# Patient Record
Sex: FEMALE | Race: WHITE | URBAN - METROPOLITAN AREA
[De-identification: names, ages, dates, MRNs, and addresses within clinical notes are randomized per-mention and may not be internally consistent; named-entity substitution may affect disease eponyms.]

---

## 2024-01-02 ENCOUNTER — APPOINTMENT (RX ONLY)
Dept: URBAN - METROPOLITAN AREA CLINIC 145 | Facility: CLINIC | Age: 69
Setting detail: DERMATOLOGY
End: 2024-01-02

## 2024-01-02 DIAGNOSIS — Z41.9 ENCOUNTER FOR PROCEDURE FOR PURPOSES OTHER THAN REMEDYING HEALTH STATE, UNSPECIFIED: ICD-10-CM

## 2024-01-02 PROCEDURE — ? INVENTORY

## 2024-01-02 PROCEDURE — ? COSMETIC CONSULTATION: HAIR REMOVAL

## 2024-01-02 PROCEDURE — ? PALOMAR VECTUS LASER HAIR REMOVAL

## 2024-01-02 NOTE — PROCEDURE: PALOMAR VECTUS LASER HAIR REMOVAL
Comments: This was a test spot on submandibular chin. No charge for today. Patient will reschedule for treatment on another day. Quoted $110 per session for lip and chin.
Rate (Hz): Medium
Hair Diameter (Optional): Coarse
Render Post-Care In The Note: No
Pre-Procedure: Prior to proceeding the treatment areas were cleaned and all present put on their eye protection.
Treatment Number: 1
Consent: Written consent obtained, risks reviewed including but not limited to crusting, scabbing, blistering, scarring, darker or lighter pigmentary change, paradoxical hair regrowth, incomplete removal of hair and infection.
Optic Size: 12 x 12mm
Pulse Duration (Include Units): 19 ms
Melanin Index (Optional): III
Hair Color (Optional): Dark Brown
External Cooling Fan Speed: 0
Post-Care Instructions: I reviewed with the patient in detail post-care instructions. Patient should avoid sun for a minimum of 4 weeks before and after treatment.
Detail Level: Simple
Laser Type: diode 810nm
Cooling Override: chill tip
Fluence In J/Cm2: 24
Post-Procedure Care: Immediate endpoint: perifollicular erythema and edema. Post care was reviewed with the patient and all patient questions were answered to their satisfaction. SPF applied.

## 2024-01-04 ENCOUNTER — APPOINTMENT (RX ONLY)
Dept: URBAN - METROPOLITAN AREA CLINIC 145 | Facility: CLINIC | Age: 69
Setting detail: DERMATOLOGY
End: 2024-01-04

## 2024-01-04 DIAGNOSIS — Z41.9 ENCOUNTER FOR PROCEDURE FOR PURPOSES OTHER THAN REMEDYING HEALTH STATE, UNSPECIFIED: ICD-10-CM

## 2024-01-04 PROCEDURE — ? INVENTORY

## 2024-01-04 PROCEDURE — ? PALOMAR VECTUS LASER HAIR REMOVAL

## 2024-01-04 NOTE — PROCEDURE: PALOMAR VECTUS LASER HAIR REMOVAL
Comments: $110 per session for lip and chin. Holiday special -25% and patient had prepaid $55 for consult. Only $27.50 due for her visit today.
Rate (Hz): Medium
Hair Diameter (Optional): Coarse
Render Post-Care In The Note: No
Pre-Procedure: Prior to proceeding the treatment areas were cleaned and all present put on their eye protection.
Treatment Number: 1
Consent: Written consent obtained, risks reviewed including but not limited to crusting, scabbing, blistering, scarring, darker or lighter pigmentary change, paradoxical hair regrowth, incomplete removal of hair and infection.
Optic Size: 12 x 12mm
Pulse Duration (Include Units): 18 ms
Melanin Index (Optional): II
Hair Color (Optional): Dark Brown
External Cooling Fan Speed: 0
Post-Care Instructions: I reviewed with the patient in detail post-care instructions. Patient should avoid sun for a minimum of 4 weeks before and after treatment.
Detail Level: Simple
Laser Type: diode 810nm
Cooling Override: chill tip
Fluence In J/Cm2: 28
Post-Procedure Care: Immediate endpoint: perifollicular erythema and edema. Post care was reviewed with the patient and all patient questions were answered to their satisfaction. SPF applied.

## 2024-02-08 ENCOUNTER — APPOINTMENT (RX ONLY)
Dept: URBAN - METROPOLITAN AREA CLINIC 145 | Facility: CLINIC | Age: 69
Setting detail: DERMATOLOGY
End: 2024-02-08

## 2024-02-08 DIAGNOSIS — Z41.9 ENCOUNTER FOR PROCEDURE FOR PURPOSES OTHER THAN REMEDYING HEALTH STATE, UNSPECIFIED: ICD-10-CM

## 2024-02-08 PROCEDURE — ? PALOMAR VECTUS LASER HAIR REMOVAL

## 2024-02-08 PROCEDURE — ? INVENTORY

## 2024-02-08 NOTE — PROCEDURE: PALOMAR VECTUS LASER HAIR REMOVAL
Rate (Hz): Medium
Hair Diameter (Optional): Coarse
Render Post-Care In The Note: No
Pre-Procedure: Prior to proceeding the treatment areas were cleaned and all present put on their eye protection.
Treatment Number: 2
Consent: Written consent obtained, risks reviewed including but not limited to crusting, scabbing, blistering, scarring, darker or lighter pigmentary change, paradoxical hair regrowth, incomplete removal of hair and infection.
Optic Size: 12 x 12mm
Pulse Duration (Include Units): 18 ms
Melanin Index (Optional): II
Hair Color (Optional): Dark Brown
External Cooling Fan Speed: 0
Post-Care Instructions: I reviewed with the patient in detail post-care instructions. Patient should avoid sun for a minimum of 4 weeks before and after treatment.
Detail Level: Simple
Laser Type: diode 810nm
Cooling Override: chill tip
Fluence In J/Cm2: 28
Post-Procedure Care: Immediate endpoint: perifollicular erythema and edema. Post care was reviewed with the patient and all patient questions were answered to their satisfaction. SPF applied.

## 2024-03-19 ENCOUNTER — APPOINTMENT (RX ONLY)
Dept: URBAN - METROPOLITAN AREA CLINIC 145 | Facility: CLINIC | Age: 69
Setting detail: DERMATOLOGY
End: 2024-03-19

## 2024-03-19 DIAGNOSIS — Z41.9 ENCOUNTER FOR PROCEDURE FOR PURPOSES OTHER THAN REMEDYING HEALTH STATE, UNSPECIFIED: ICD-10-CM

## 2024-03-19 PROCEDURE — ? PALOMAR VECTUS LASER HAIR REMOVAL

## 2024-03-19 PROCEDURE — ? INVENTORY

## 2024-03-19 NOTE — PROCEDURE: PALOMAR VECTUS LASER HAIR REMOVAL
Comments: Patient quoted small area LHR $85 per session if more than 6 sessions are needed. Patient feels that there is still quite a bit of hair for it being the third session. Underlying conditions for excess hair growth discussed with patient. Inspected area thoroughly to ensure hairs were not white. Desired respond from treatment obtained during session today.
Rate (Hz): Medium
Hair Diameter (Optional): Coarse
Render Post-Care In The Note: No
Pre-Procedure: Prior to proceeding the treatment areas were cleaned and all present put on their eye protection.
Treatment Number: 3
Consent: Written consent obtained, risks reviewed including but not limited to crusting, scabbing, blistering, scarring, darker or lighter pigmentary change, paradoxical hair regrowth, incomplete removal of hair and infection.
Optic Size: 12 x 12mm
Pulse Duration (Include Units): 15 ms
Melanin Index (Optional): II
Hair Color (Optional): Black
External Cooling Fan Speed: 0
Post-Care Instructions: I reviewed with the patient in detail post-care instructions. Patient should avoid sun for a minimum of 4 weeks before and after treatment.
Detail Level: Simple
Laser Type: diode 810nm
Cooling Override: chill tip
Fluence In J/Cm2: 24
Post-Procedure Care: Immediate endpoint: perifollicular erythema and edema. Post care was reviewed with the patient and all patient questions were answered to their satisfaction. SPF applied.

## 2024-04-04 ENCOUNTER — APPOINTMENT (RX ONLY)
Dept: URBAN - METROPOLITAN AREA CLINIC 145 | Facility: CLINIC | Age: 69
Setting detail: DERMATOLOGY
End: 2024-04-04

## 2024-04-04 DIAGNOSIS — Z41.9 ENCOUNTER FOR PROCEDURE FOR PURPOSES OTHER THAN REMEDYING HEALTH STATE, UNSPECIFIED: ICD-10-CM

## 2024-04-04 PROCEDURE — ? PALOMAR VECTUS LASER HAIR REMOVAL

## 2024-04-04 PROCEDURE — ? INVENTORY

## 2024-04-04 NOTE — PROCEDURE: PALOMAR VECTUS LASER HAIR REMOVAL
Comments: Patient quoted small area LHR $85 per session if more than 6 sessions are needed. Patient feels that there is still quite a bit of hair for it being the third session. Underlying conditions for excess hair growth discussed with patient. Inspected area thoroughly to ensure hairs were not white. Desired respond from treatment obtained during session today.
Rate (Hz): Medium
Hair Diameter (Optional): Coarse
Render Post-Care In The Note: No
Pre-Procedure: Prior to proceeding the treatment areas were cleaned and all present put on their eye protection.
Treatment Number: 4
Consent: Written consent obtained, risks reviewed including but not limited to crusting, scabbing, blistering, scarring, darker or lighter pigmentary change, paradoxical hair regrowth, incomplete removal of hair and infection.
Optic Size: 12 x 12mm
Pulse Duration (Include Units): 16 ms
Melanin Index (Optional): II
Hair Color (Optional): Black
External Cooling Fan Speed: 0
Post-Care Instructions: I reviewed with the patient in detail post-care instructions. Patient should avoid sun for a minimum of 4 weeks before and after treatment.
Detail Level: Simple
Laser Type: diode 810nm
Cooling Override: chill tip
Fluence In J/Cm2: 25
Post-Procedure Care: Immediate endpoint: perifollicular erythema and edema. Post care was reviewed with the patient and all patient questions were answered to their satisfaction. SPF applied.

## 2024-04-30 ENCOUNTER — APPOINTMENT (RX ONLY)
Dept: URBAN - METROPOLITAN AREA CLINIC 145 | Facility: CLINIC | Age: 69
Setting detail: DERMATOLOGY
End: 2024-04-30

## 2024-04-30 DIAGNOSIS — Z41.9 ENCOUNTER FOR PROCEDURE FOR PURPOSES OTHER THAN REMEDYING HEALTH STATE, UNSPECIFIED: ICD-10-CM

## 2024-04-30 PROCEDURE — ? IPL HAIR REMOVAL

## 2024-04-30 PROCEDURE — ? ADDITIONAL NOTES

## 2024-04-30 PROCEDURE — ? INVENTORY

## 2024-04-30 ASSESSMENT — LOCATION DETAILED DESCRIPTION DERM
LOCATION DETAILED: LEFT LATERAL BUCCAL CHEEK
LOCATION DETAILED: RIGHT UPPER CUTANEOUS LIP
LOCATION DETAILED: RIGHT LATERAL BUCCAL CHEEK
LOCATION DETAILED: LEFT UPPER CUTANEOUS LIP
LOCATION DETAILED: LEFT CHIN
LOCATION DETAILED: SUBMENTAL CHIN
LOCATION DETAILED: PHILTRUM

## 2024-04-30 ASSESSMENT — LOCATION SIMPLE DESCRIPTION DERM
LOCATION SIMPLE: SUBMENTAL CHIN
LOCATION SIMPLE: LEFT CHEEK
LOCATION SIMPLE: CHIN
LOCATION SIMPLE: UPPER LIP
LOCATION SIMPLE: RIGHT CHEEK
LOCATION SIMPLE: LEFT LIP
LOCATION SIMPLE: RIGHT LIP

## 2024-04-30 ASSESSMENT — LOCATION ZONE DERM
LOCATION ZONE: LIP
LOCATION ZONE: FACE

## 2024-04-30 NOTE — PROCEDURE: ADDITIONAL NOTES
Additional Notes: Due to our Ben Vectus being down for maintenance the Ben Starlux was used for treatment today. I discussed with patient that if she does not feel a difference from her treatment today, I will perform a courtesy treatment once the Vectus is up and running.
Render Risk Assessment In Note?: no
Detail Level: Zone

## 2024-04-30 NOTE — PROCEDURE: IPL HAIR REMOVAL
Detail Level: Simple
Skin Type: II
Treatment Administered By (Optional): Ghislaine
Anesthesia Type: 1% lidocaine with epinephrine
External Cooling: Contact cooling
External Cooling Fan Speed: 0
Pre-Procedure: Prior to proceeding the treatment areas were cleaned and all present put on their eye protection.
Post-Procedure Care: Immediate endpoint: perifollicular erythema and edema. Vaseline and ice applied. Post care reviewed with patient.
Laser Type: Ben Starlux
Fluence: 23
Filter/Handpiece: R
Treatment Number: 5
Pulse Duration (In Ms): 20
Consent: Written consent obtained, risks reviewed including but not limited to crusting, scabbing, blistering, scarring, darker or lighter pigmentary change, paradoxical hair regrowth, incomplete removal of hair and infection.
Post-Care Instructions: I reviewed with the patient in detail post-care instructions. Patient should avoid sun for a minimum of 4 weeks before and after treatment.
Render Post-Care In The Note: No

## 2024-07-16 ENCOUNTER — APPOINTMENT (RX ONLY)
Dept: URBAN - METROPOLITAN AREA CLINIC 145 | Facility: CLINIC | Age: 69
Setting detail: DERMATOLOGY
End: 2024-07-16

## 2024-07-16 DIAGNOSIS — Z41.9 ENCOUNTER FOR PROCEDURE FOR PURPOSES OTHER THAN REMEDYING HEALTH STATE, UNSPECIFIED: ICD-10-CM

## 2024-07-16 PROCEDURE — ? INVENTORY

## 2024-07-16 PROCEDURE — ? ADDITIONAL NOTES

## 2024-07-16 PROCEDURE — ? IPL HAIR REMOVAL

## 2024-07-16 NOTE — PROCEDURE: ADDITIONAL NOTES
Additional Notes: There was no charge for patients session today. Patient was originally being treated for LHR with our Ben Vectus, however the machine has been down for several weeks. For this inconvenience I offered the patient a courtesy LHR IPL session in hopes to minimize some of the growth until our Vectus is up and running.
Render Risk Assessment In Note?: no
Detail Level: Simple

## 2024-07-16 NOTE — PROCEDURE: IPL HAIR REMOVAL
Anesthesia Type: 1% lidocaine with epinephrine
Render Post-Care In The Note: No
External Cooling: Contact cooling
Detail Level: Simple
# Of Treatments In Package: 0
Cooling: chill tip
Laser Type: Ben Starlux
Filter/Handpiece: Lux R
Fluence: 40
Pre-Procedure: Prior to proceeding the treatment areas were cleaned and all present put on their eye protection.
Skin Type: II
Treatment Number: 2
Pulse Duration (In Ms): 20
Fluence: 41
Consent: Written consent obtained, risks reviewed including but not limited to crusting, scabbing, blistering, scarring, darker or lighter pigmentary change, paradoxical hair regrowth, incomplete removal of hair and infection.
Pulse Duration (In Ms): 100
Post-Care Instructions: I reviewed with the patient in detail post-care instructions. Patient should avoid sun for a minimum of 4 weeks before and after treatment.
Treatment Administered By (Optional): Ghislaine
Post-Procedure Care: Immediate endpoint: perifollicular erythema and edema. Vaseline and ice applied. Post care reviewed with patient.